# Patient Record
Sex: FEMALE
[De-identification: names, ages, dates, MRNs, and addresses within clinical notes are randomized per-mention and may not be internally consistent; named-entity substitution may affect disease eponyms.]

---

## 2018-10-13 ENCOUNTER — HOSPITAL ENCOUNTER (EMERGENCY)
Dept: HOSPITAL 75 - ER | Age: 48
Discharge: HOME | End: 2018-10-13
Payer: COMMERCIAL

## 2018-10-13 VITALS — WEIGHT: 154 LBS | HEIGHT: 64 IN | BODY MASS INDEX: 26.29 KG/M2

## 2018-10-13 VITALS — SYSTOLIC BLOOD PRESSURE: 125 MMHG | DIASTOLIC BLOOD PRESSURE: 78 MMHG

## 2018-10-13 DIAGNOSIS — K21.0: Primary | ICD-10-CM

## 2018-10-13 DIAGNOSIS — Z79.51: ICD-10-CM

## 2018-10-13 DIAGNOSIS — Z87.19: ICD-10-CM

## 2018-10-13 PROCEDURE — 99283 EMERGENCY DEPT VISIT LOW MDM: CPT

## 2018-10-13 NOTE — XMS REPORT
[facility]

 Created on: 10/07/2018



Marie Cohen

External Reference #: [id number]

: 1970

Sex: Female



Demographics







 Address  [address] [place]  [address]

 

 Preferred Language  Unknown

 

 Marital Status  Unknown

 

 Hoahaoism Affiliation  Unknown

 

 Race  Unknown

 

 Ethnic Group  Unknown





Author







 Author  DIMPLE UNDERWOOD

 

 [facility]

 

 Address  [address] [place]  [address]



 

 Phone  [phone]







Care Team Providers







 Care Team Member Name  Role  Phone

 

 DIMPLE UNDERWOOD  Unavailable  [phone]







PROBLEMS







 Type  Condition  ICD9-CM Code  GMK30-GG Code  Onset Dates  Condition Status  
SNOMED Code

 

 Problem  Difficulty swallowing liquids     R13.10     Active  186210930

 

 Problem  Gastric reflux     K21.9     Active  917709140

 

 Problem  Anxiety     F41.9     Active  05655816







ALLERGIES

No Information



ENCOUNTERS







 Encounter  Location  Date  Diagnosis

 

 [facility]  [address]  [id number] [place] [address]  05 Oct
, 2018  Shortness of breath R06.02 ; Gastric reflux K21.9 and Difficulty 
swallowing liquids R13.10

 

 [facility]  [address]  [id number] [place] [address]  25 Sep
, 2018   

 

 [place] [address]  [id number] [place] [address]  19 Sep
, 2018  Gastric reflux K21.9 and H. pylori infection A04.8

 

 [facility] [place]  [address] [facility] [id number] [address]  18 Sep, 2018   

 

 [facility]  [address]  [id number] [place] [address]    Left breast lump N63.20

 

 [facility]  [address] [id number] 
[address]  17 May, 2018  Dysuria R30.0 and Recurrent UTI (urinary tract 
infection) N39.0

 

 [facility]  [address] [id number] Fork[place] 
[address]    Multiple complaints R68.89

 

 [facility]  [address] [id number] [place] 
[address]  31 Oct, 2017  Urinary frequency R35.0 ; Multiple complaints R68.89 
and Anxiety F41.9

 

 [facility]  [address] [place] [id number] [place] 
[address]    Lump of left breast N63 ; Well woman exam Z01.419 and 
Uterine leiomyoma, unspecified location D25.9

 

 [facility] [place]  [address]65100[place] [address]  29 Mar, 2017  Bronchitis J40

 

 [facility]  [address] [place] [address] [id number][place] [id number]  10 Feb, 2017   

 

 [facility] [place]  [address] [place] [id number]    Sore throat J02.9 and Acute upper respiratory infection 
J06.9

 

 [place] [address] [id number] [place]ork[place] [id number]  
  Encounter for immunization Z23

 

 [facility]  [address] [id number] Fork[place] [address]  
26 Oct, 2016  Dizziness R42

 

 [place] [id number] [place] [id number]  
24 Oct, 2016  Dizziness R42 and Anemia due to other cause, not classified D64.89

 

 [facility]  [address] [facility] [id number] [place]ork[place] [address]  
02 May, 2016  Anemia due to other cause, not classified D64.89 and Abnormal 
urine R82.90

 

 [facility]  [address] [facility] [id number], [place] [id number]     

 

 [facility]  [address] [id number] [place]ork[place] [address]  
  Abnormal urine R82.90 ; Acute cystitis with hematuria N30.01 and 
Iron deficiency E61.1

 

 [facility]  [address] [id number] Fork[place] [address]  
   

 

 [facility]  [id number] Fork[place] [id number]  
  Anemia due to other cause, not classified D64.89

 

 [facility]  [address] [facility] [id number] Fork[place] [address]  
   

 

 [place] [address] [id number] [place]ork[place] [address]  
  Anemia due to other cause, not classified D64.89

 

 [place] [address] [id number] [place]ork[place] [address]  
   

 

 [place] [address] [id number] [place]ork[place] [address]  
  Anemia due to other cause, not classified D64.89

 

 [facility]  [address] [facility] [id number] [place]ork[place] [phone]  
  Gastroesophageal reflux disease, esophagitis presence not 
specified K21.9 and Anemia due to other cause, not classified D64.89

 

 [place] [address] [id number] [place]ork[place] [id number]  
31 Mar, 2016  Prolonged menstrual cycle N92.1 ; Dizziness R42 ; GERD (
gastroesophageal reflux disease) K21.9 ; Chest pain R07.9 ; Fatigue R53.83 and 
Chills (without fever) R68.83

 

 [place] [address] [id number] Fork, [place] [id number]  
30 Mar, 2016  Prolonged menstrual cycle N92.1 ; Dizziness R42 ; GERD (
gastroesophageal reflux disease) K21.9 ; Chest pain R07.9 ; Fatigue R53.83 and 
Chills (without fever) R68.83

 

 [place] [address] [id number] [place]ork[place] [id number]  
27 Oct, 2015  Encounter for immunization Z23

 

 [place] [address] [id number] [place]ork[place] [address]  
26 Oct, 2015  Allergic rhinitis, unspecified allergic rhinitis type J30.9 and 
Post-nasal drip R09.82

 

 [address] [id number], [place] [address]  27 Aug, 2015  Chest pain 786.50 and GERD (gastroesophageal 
reflux disease) 530.81

 

 [place] [id number] [place] [id number]  10 Mohan, 2015  Well woman exam V70.0 ; Pap smear for cervical 
cancer screening V76.2 and Breast cancer screening V76.10

 

 [place] [id number] [place] [id number]    Encounter to establish care V65.8







IMMUNIZATIONS

No Known Immunizations



SOCIAL HISTORY

Never Assessed



REASON FOR VISIT

Requests return call



PLAN OF CARE





VITAL SIGNS





MEDICATIONS

No Known Medications



RESULTS

No Results



PROCEDURES

No Known procedures



INSTRUCTIONS





MEDICATIONS ADMINISTERED

No Known Medications



MEDICAL (GENERAL) HISTORY







 Type  Description  Date

 

 Medical History  acid reflux   

 

 Medical History  seasonal allergies   

 

 Medical History  anemia   

 

 Surgical History  No Surgical history information   

 

 Hospitalization History  Abnormal vaginal bleeding

## 2018-10-13 NOTE — XMS REPORT
[facility]

 Created on: 2017



Marie Cohen

External Reference #: [id number]

: 1970

Sex: Female



Demographics







 Address  [address] [place] [place] [address]  [address]

 

 Preferred Language  Unknown

 

 Marital Status  Unknown

 

 Mormonism Affiliation  Unknown

 

 Race  Unknown

 

 Ethnic Group  Unknown





Author







 Author  JANEE Steen

 

 [facility]

 

 Address  Unknown

 

 Phone  Unavailable







Care Team Providers







 Care Team Member Name  Role  Phone

 

 JANEE Steen  Unavailable  Unavailable







PROBLEMS

No Known Problems



ALLERGIES

No Information



SOCIAL HISTORY

Never Assessed



PLAN OF CARE





VITAL SIGNS





MEDICATIONS

Unknown Medications



RESULTS

No Results



PROCEDURES

No Known procedures



IMMUNIZATIONS

No Known Immunizations



MEDICAL (GENERAL) HISTORY







 Type  Description  Date

 

 Medical History  acid reflux   

 

 Medical History  seasonal allergies   

 

 Medical History  anemia

## 2018-10-13 NOTE — ED GI
General


Chief Complaint:  Respiratory Problems


Stated Complaint:  SOB


Nursing Triage Note:  


PT CO OF SOA STATES STARTED THIS AM THEN BETTER THEN WORSE  ABOUT 1830. PT 


STATES HAS SOA. PT  CALLED ON PHONE STATES HAS HAPPENED BEFORE LAST WEEK 


AT [place] AND PT GIVEN GI COCKTAIL. PT IS CURRENTLY HYPERVENTILATING, STATES 


HAS AND FEET TINGLING. DAUGHTER AT SIDE. PT TO MONITOR. PT HAS HX OF ACID REFLUX


Sepsis Screen:  No Definite Risk


Source of Information:  Patient, Family (daughter)


Exam Limitations:  No Limitations





History of Present Illness


Date Seen by Provider:  Oct 13, 2018


Time Seen by Provider:  19:46


Initial Comments


The patient presents to the ER by private conveyance with her daughter and a 

chief complaint that this morning she was having some burning pressure in her 

chest and just got worse after taking her amoxicillin that she was recently put 

on for H. pylori. She feels like she has a spasm or something tight in the like 

a ball in her chest. She was seen at [facility] for this week or so ago and 

diagnosed with esophageal problems and gastritis and sent to the surgeon who 

has set her up to have a barium swallow study this Monday, 2 days from now. She 

says the discomfort and pain in her chest is so much that she can't stand it. 

They put her on pantoprazole and 2 antibiotics. No Carafate. She uses Maalox 

and in the morning and that helped a little bit but she's not used any more 

today. She has no primary history of coronary or respiratory disease. No asthma 

COPD or smoking. No diabetes hypothyroidism, hypertension, hypercholesterolemia 

or primary family history of coronary artery disease before the age of 60. She 

describes her pain as a tight ball at the top of her chest that does not 

radiate. She has some nausea but no vomiting. She had a EGD done 2 or 3 years 

ago but no mention of dilatation.





Allergies and Home Medications


Allergies


Coded Allergies:  


     No Known Drug Allergies (Unverified , 10/13/18)





Home Medications


Albuterol Sulfate 1 Puff Puff, 2 PUFF IH Q4H, (Reported)


   1 PUFF = 90 MCG 


Pantoprazole Sodium 40 Mg Tablet.dr, 40 MG PO DAILY, (Reported)





Patient Home Medication List


Home Medication List Reviewed:  Yes





Review of Systems


Review of Systems


Constitutional:  No chills, No diaphoresis


EENTM:  No Blurred Vision, No Double Vision


Respiratory:  Denies Cough, Denies Orthopnea; Shortness of Air


Cardiovascular:  See HPI, Chest Pain


Gastrointestinal:  Denies Abdomen Distended, Denies Abdominal Pain


Genitourinary:  Denies Burning, Denies Discharge


Musculoskeletal:  No back pain, No joint pain


Skin:  No pruritus, No rash





Past Medical-Social-Family Hx


Patient Social History


Alcohol Use:  Denies Use


Recreational Drug Use:  No


Smoking Status:  Never a Smoker


Recent Foreign Travel:  No


Contact w/Someone Who Travel:  No


Recent Infectious Disease Expo:  No


Recent Hopitalizations:  No





Seasonal Allergies


Seasonal Allergies:  No





Past Medical History


Surgeries:  No


Respiratory:  No


Cardiac:  No


Neurological:  No


Genitourinary:  No


Gastrointestinal:  Yes (H-PYLORI)


Gastroesophageal Reflux


Endocrine:  No


HEENT:  No


Cancer:  No


Psychosocial:  No


Integumentary:  No





Physical Exam


Vital Signs





Vital Signs - First Documented








 10/13/18





 18:50


 


Temp 98.1


 


Pulse 81


 


Resp 28


 


B/P (MAP) 145/92 (109)


 


Pulse Ox 100





Capillary Refill : Less Than 3 Seconds


Height/Weight/BMI


Height: 5'4.00"


Weight: 154lbs. oz. 69.146043cu;  BMI


Method:Stated


General Appearance:  WD/WN, no apparent distress, other (anxious)


HEENT:  PERRL/EOMI, pharynx normal


Neck:  non-tender, normal inspection


Respiratory:  chest non-tender, lungs clear, normal breath sounds, no 

respiratory distress, no accessory muscle use


Cardiovascular:  normal peripheral pulses, regular rate, rhythm


Gastrointestinal:  normal bowel sounds, non tender, soft


Extremities:  normal inspection, normal capillary refill





Progress/Results/Core Measures


Results/Orders


My Orders





Orders - KIKA ADAMSON


Ondansetron  Oral Dissolve Tab (Zofran (10/13/18 19:51)


Lidocaine 2% Viscous 15 Ml (Xylocaine Vi (10/13/18 20:00)


Famotidine Tablet (Pepcid Tablet) (10/13/18 19:51)


Antacid  Suspension (Mylanta  Suspension (10/13/18 20:00)





Medications Given in ED





Current Medications








 Medications  Dose


 Ordered  Sig/Dileep


 Route  Start Time


 Stop Time Status Last Admin


Dose Admin


 


 Al Hydrox/Mg


 Hydrox/Simethicone  30 ml  ONCE  ONCE


 PO  10/13/18 20:00


 10/13/18 20:01 DC 10/13/18 20:08


30 ML


 


 Lidocaine HCl  15 ml  ONCE  ONCE


 PO  10/13/18 20:00


 10/13/18 20:01 DC 10/13/18 20:08


15 ML








Vital Signs/I&O











 10/13/18





 18:50


 


Temp 98.1


 


Pulse 81


 


Resp 28


 


B/P (MAP) 145/92 (109)


 


Pulse Ox 100














Blood Pressure Mean:  109











Progress


Progress Note #1:  


   Time:  20:58


Progress Note


Patient's having quite a bit of anxiety that was been diagnosed as sounds like 

esophageal spasms and GERD. Try GI cocktail and some Pepcid and if we can get 

her symptoms feeling better we will probably put her out on some Carafate in 

addition. She says the Mylanta helps. No real risk for heart disease. She does 

not have any evidence on her vital signs of any elevated heart rate, decreased 

oxygen saturation below 100%.


After giving her some of the GI cocktail she is slowly sipping it down and says 

it makes her tongue and esophagus numb and it is decreasing her pain and 

discomfort in her chest.


Progress Note #2:  


   Time:  21:25


Progress Note


The patient's pain is relieved by the GI cocktail and Pepcid. We will set her 

up with some Carafate outpatient and keep her appointment for a barium swallow 

study Monday morning.





Departure


Impression





 Primary Impression:  


 GERD with esophagitis


Disposition:  01 HOME, SELF-CARE


Condition:  Stable





Departure-Patient Inst.


Decision time for Depature:  21:26


Patient Instructions:  Acid Reflux (Gastroesophageal Reflux Disease), Adult (DC)





Add. Discharge Instructions:  


 the Carafate and start taking it 30 minutes before all 3 meals and at 

bedtime for the next 2 weeks. Keep your scheduled appointments with the 

surgeon. All discharge instructions reviewed with patient and/or family. Voiced 

understanding.


Scripts


Sucralfate (Carafate) 1 Gm Tablet


1 GM PO QIDACHS for 14 Days, #56 TAB 0 Refills


   Prov: KIKA ADAMSON         10/13/18











KIKA ADAMSON Oct 13, 2018 20:00

## 2018-10-13 NOTE — XMS REPORT
[facility]

 Created on: 08/15/2018



Marie Cohen

External Reference #: [id number]

: 1970

Sex: Female



Demographics







 Address  [address] [place]  [address]

 

 Preferred Language  Unknown

 

 Marital Status  Unknown

 

 Spiritism Affiliation  Unknown

 

 Race  Unknown

 

 Ethnic Group  Unknown





Author







 Author  JOHNNIE SMITH

 

 [facility]

 

 Address  [address] [place] [address]  [address]



 

 Phone  [phone]







Care Team Providers







 Care Team Member Name  Role  Phone

 

 JOHNNIE SMITH  Unavailable  [phone]







PROBLEMS







 Type  Condition  ICD9-CM Code  RAW43-FR Code  Onset Dates  Condition Status  
SNOMED Code

 

 Problem  Anxiety     F41.9     Active  37860173







ALLERGIES







 Substance  Reaction  Event Type  Date  Status

 

 Ibuprofen  angioedema  Drug Allergy  17 May, 2018  Active







ENCOUNTERS







 Encounter  Location  Date  Diagnosis

 

 [facility] [address] [id number] [place] [address]    Left breast lump N63.20

 

 [facility]  [address] [place], [place] [id number], [place] 
[address]  17 May, 2018  Dysuria R30.0 and Recurrent UTI (urinary tract 
infection) N39.0

 

 [facility]  [address] [place] [address], [place] 
[address]    Multiple complaints R68.89

 

 [facility]  [address] [place], [place] [id number], [place] 
[address]  31 Oct, 2017  Urinary frequency R35.0 ; Multiple complaints R68.89 
and Anxiety F41.9

 

 [facility]  [address] [place], [place] [id number], [place] 
[address]    Lump of left breast N63 ; Well woman exam Z01.419 and 
Uterine leiomyoma, unspecified location D25.9

 

 [facility]  [address]65100[place] [address]  29 Mar, 2017  Bronchitis J40

 

 [facility]  [address] [place] [address], [place] [id number]  10 2017   

 

 [facility]  [address] [place] [address] [id number] [address]  08 2017  Sore throat J02.9 and Acute upper respiratory infection 
J06.9

 

 [place] [address] [id number], [place] [id number]  
  Encounter for immunization Z23

 

 [place] [address] [id number], [place] [id number]  
26 Oct, 2016  Dizziness R42

 

 [place] [address] [id number], [place] [address]  
24 Oct, 2016  Dizziness R42 and Anemia due to other cause, not classified D64.89

 

 [place] [address] [id number], [place] [address]  
02 May, 2016  Anemia due to other cause, not classified D64.89 and Abnormal 
urine R82.90

 

 [address] [id number], [place] [id number]     

 

 [place] [address] [id number], [place] [id number]  
  Abnormal urine R82.90 ; Acute cystitis with hematuria N30.01 and 
Iron deficiency E61.1

 

 [place] [address] [id number], [place] [address]  
   

 

 [place] [id number] [address]  
  Anemia due to other cause, not classified D64.89

 

 [address]65100[place] [address]  
   

 

 [place] [address] [id number], [place] [address]  
  Anemia due to other cause, not classified D64.89

 

 [place] [address] [id number], [place] [id number]  
   

 

 [place] [address] [id number], [place] [address]  
  Anemia due to other cause, not classified D64.89

 

 [facility] [place]  [address] [facility] [id number], [place] [id number]  
  Gastroesophageal reflux disease, esophagitis presence not 
specified K21.9 and Anemia due to other cause, not classified D64.89

 

 [facility]  [address] [id number], [place] [id number]  
31 Mar, 2016  Prolonged menstrual cycle N92.1 ; Dizziness R42 ; GERD (
gastroesophageal reflux disease) K21.9 ; Chest pain R07.9 ; Fatigue R53.83 and 
Chills (without fever) R68.83

 

 [place] [address] [id number], [place] [address]  
30 Mar, 2016  Prolonged menstrual cycle N92.1 ; Dizziness R42 ; GERD (
gastroesophageal reflux disease) K21.9 ; Chest pain R07.9 ; Fatigue R53.83 and 
Chills (without fever) R68.83

 

 [place] [address] [id number], [place] [id number]  
27 Oct, 2015  Encounter for immunization Z23

 

 [place] [address] [id number], [place] [address]  
26 Oct, 2015  Allergic rhinitis, unspecified allergic rhinitis type J30.9 and 
Post-nasal drip R09.82

 

 [address] [id number], [place] [id number]  27 Aug, 2015  Chest pain 786.50 and GERD (gastroesophageal 
reflux disease) 530.81

 

 [address] [id number], [place] [id number]  10 Mohan, 2015  Well woman exam V70.0 ; Pap smear for cervical 
cancer screening V76.2 and Breast cancer screening V76.10

 

 [address] [id number], [place] [address]    Encounter to establish care V65.8







IMMUNIZATIONS

No Known Immunizations



SOCIAL HISTORY

Never Assessed



REASON FOR VISIT

pain in right side 2 days ago, states she peed a large amount. onsunday she had 
severe pain in the right pelvic area, and body aches:JACINTO Frank



PLAN OF CARE







 Activity  Details









  









 Follow Up  prn Reason:







VITAL SIGNS







 Height  5 ft 3 in in  2018

 

 Weight  154 lbs  2018

 

 Temperature  98 degrees Fahrenheit  2018

 

 Heart Rate  80 bpm  2018

 

 Respiratory Rate  16   2018

 

 BMI  27.28 kg/m2  2018

 

 Blood pressure systolic  118 mmHg  2018

 

 Blood pressure diastolic  62 mmHg  2018







MEDICATIONS







 Medication  Instructions  Dosage  Frequency  Start Date  End Date  Duration  
Status

 

 Tylenol 325 MG  Orally every 6 hrs  2 tablets as needed  6h           Active

 

 Loratadine 10 MG  Orally Once a day  1 capsule  24h           Active

 

 Bactrim -160 MG  Orally Twice a day  1 tablet  12h  17 May, 2018  24 May
, 2018  07 days  Active

 

 Polysaccharide Iron Complex 150 MG  Orally 2 times a day  1 capsule  12h       30 day(s)  Not-Taking

 

 Lansoprazole 3 MG/ML  Orally 2 times a day  5ml  12h       30 day(s
)  Not-Taking

 

 Prevacid 24HR 15 MG  Orally Once a day  1 capsule  24h           Not-Taking







RESULTS

No Results



PROCEDURES







 Procedure  Date Ordered  Result  Body Site

 

 URINALYSIS, AUTO, W/O SCOPE  May 17, 2018      

 

 URINE CULTURE/COLONY COUNT  May 17, 2018      







INSTRUCTIONS





MEDICATIONS ADMINISTERED

No Known Medications



MEDICAL (GENERAL) HISTORY







 Type  Description  Date

 

 Medical History  acid reflux   

 

 Medical History  seasonal allergies   

 

 Medical History  anemia   

 

 Hospitalization History  Abnormal vaginal bleeding

## 2018-10-13 NOTE — XMS REPORT
[facility]

 Created on: 2016



Marie Cohen

External Reference #: [id number]

: 1970

Sex: Female



Demographics







 Address  [address] [place]  [address]

 

 Home Phone  [phone]

 

 Preferred Language  Unknown

 

 Marital Status  Unknown

 

 Zoroastrianism Affiliation  Unknown

 

 Race  

 

 Ethnic Group   or 





Author







 Author  BRITNEY CONWAY

 

 Organization  [employer]

 

 Address  Unknown

 

 Phone  Unavailable







Care Team Providers







 Care Team Member Name  Role  Phone

 

 BRITNEY CONWAY  CP  Unavailable



                                                                



Allergies

          No Known Allergies                                                   
                                     



Problems

          





 Problem Type  Condition  Code  Onset Dates  Condition Status

 

 Assessment  Anemia due to other cause, not classified  D64.89     Active



                                                                               
         



Medications

          No Known Medications                                                 
                             



Results

          No Known Results                                                     
               



Summary Purpose

          [employer] Submission

## 2018-10-13 NOTE — XMS REPORT
[facility]

 Created on: 2016



Marie Cohen

External Reference #: [id number]

: 1970

Sex: Female



Demographics







 Address  [address] [place]  [address]

 

 Home Phone  [phone]

 

 Preferred Language  Unknown

 

 Marital Status  Unknown

 

 Latter day Affiliation  Unknown

 

 Race  

 

 Ethnic Group   or 





Author







 Author  JANEE DUNLAP

 

 Organization  [employer]

 

 Address  Unknown

 

 Phone  Unavailable







Care Team Providers







 Care Team Member Name  Role  Phone

 

 JANEE DUNLAP    Unavailable



                                                                



Allergies

          No Known Allergies                                                   
                                     



Problems

          





 Problem Type  Condition  Code  Onset Dates  Condition Status

 

 Assessment  Encounter for immunization  Z23     Active



                                                                               
         



Medications

          No Known Medications                                                 
                                       



Procedures

          





 Procedure  Coding System  Code  Date

 

 SINGLE IMMUNIZATION ADMIN  CPT-4  77887  2016

 

 FLUARIX QUAD P-FREE 3 AND UP .50   CPT-4  01164  2016



                                                                               
         



Results

          No Known Results                                                     
                                   



Immunizations

          





 Vaccine  Administration Date

 

 FLUARIX QUAD P-FREE 3 AND UP .50 2016



                                                                    



Summary Purpose

          [employer] Submission

## 2018-10-13 NOTE — XMS REPORT
[facility]

 Created on: 10/26/2015



Marie Cohen

External Reference #: [id number]

: 1970

Sex: Female



Demographics







 Address  [address] [place] [address]  [address]

 

 Home Phone  [phone]

 

 Preferred Language  Unknown

 

 Marital Status  Unknown

 

 Hindu Affiliation  Unknown

 

 Race  

 

 Ethnic Group   or 





Author







 EVELINE Alejandro

 

 Organization  [employer]

 

 Address  Unknown

 

 Phone  Unavailable







Care Team Providers







 Care Team Member Name  Role  Phone

 

 EVELINE FUNK  CP  Unavailable



                                                                



Allergies, Adverse Reactions, Alerts

          





 Substance  Reaction  Event Type

 

 Tylenol  hives  Drug Allergy



                                                                               
         



Problems

          





 Problem Type  Condition  Code  Onset Dates  Condition Status

 

 Assessment  Allergic rhinitis, unspecified allergic rhinitis type  J30.9     
Active

 

 Assessment  Post-nasal drip  R09.82     Active



                                                                               
                   



Medications

          





 Medication  Code System  Code  Instructions  Start Date  End Date  Status  
Dosage

 

 Cetirizine HCl  NDC  [address]-64  10 MG Orally Once a day  Oct 26, 2015  Kuwlinder 
24, 2016     1 tablet as needed

 

 Prevacid 24HR  NDC  [address]-32  15 MG Orally Once a day           1 capsule



                                                                               
                   



Procedures

          





 Procedure  Coding System  Code  Date

 

 Office Visit, Est Pt., Level 3  CPT-4  20957  Oct 26, 2015

 

 MEASURE BLOOD OXYGEN LEVEL  CPT-4  25216  Oct 26, 2015



                                                                               
                             



Vital Signs

          





 Date/Time:  Oct 26, 2015

 

 Temperature  98.0 F

 

 Weight  157 lbs

 

 Height  5 ft 3 in in

 

 Oximetry  98 %

 

 Blood Pressure Diastolic  76 mmHg

 

 Blood Pressure Systolic  120 mmHg

 

 Cardiac Monitoring Heart Rate  88 bpm

 

 BMI  27.81 Index



                                                                              



Results

          No Known Results                                                     
               



Summary Purpose

          eClinical[employer] Submission

## 2018-10-13 NOTE — XMS REPORT
[facility]

 Created on: 10/06/2018



Marie Cohen

External Reference #: [id number]

: 1970

Sex: Female



Demographics







 Address  [address] [place]  [address]

 

 Preferred Language  Unknown

 

 Marital Status  Unknown

 

 Church Affiliation  Unknown

 

 Race  Unknown

 

 Ethnic Group  Unknown





Author







 Author  JAGDEEP LEE

 

 [facility] HERNANDEZ

 

 Address  [address] [place]  [address]



 

 Phone  [phone]







Care Team Providers







 Care Team Member Name  Role  Phone

 

 JAGDEEP LEE  Unavailable  [phone]







PROBLEMS







 Type  Condition  ICD9-CM Code  QKF09-EC Code  Onset Dates  Condition Status  
SNOMED Code

 

 Problem  Difficulty swallowing liquids     R13.10     Active  535297002

 

 Problem  Gastric reflux     K21.9     Active  104889135

 

 Problem  Anxiety     F41.9     Active  72985019







ALLERGIES







 Substance  Reaction  Event Type  Date  Status

 

 Ibuprofen  angioedema  Drug Allergy  19 Sep, 2018  Active







ENCOUNTERS







 Encounter  Location  Date  Diagnosis

 

 [facility]  [address]  [id number] [place] [address]  05 Oct
, 2018  Shortness of breath R06.02 ; Gastric reflux K21.9 and Difficulty 
swallowing liquids R13.10

 

 [facility]  [address]  [id number] [place] [address]  25 Sep
, 2018   

 

 [facility]  [address] [id number] [place] [address]  19 Sep
, 2018  Gastric reflux K21.9 and H. pylori infection A04.8

 

 [facility]  [address] [facility] [id number] [address]  18 Sep, 2018   

 

 [facility]  [address] [id number] [place] [address]    Left breast lump N63.20

 

 [facility]  [address] [email or web address], [place] 
[address]  17 May, 2018  Dysuria R30.0 and Recurrent UTI (urinary tract 
infection) N39.0

 

 [facility]  [address] [id number], [place] 
[address]    Multiple complaints R68.89

 

 [facility]  [address] [id number] 
[address]  31 Oct, 2017  Urinary frequency R35.0 ; Multiple complaints R68.89 
and Anxiety F41.9

 

 [facility]  [id number], [place] 
[address]    Lump of left breast N63 ; Well woman exam Z01.419 and 
Uterine leiomyoma, unspecified location D25.9

 

 [facility]  [address] [address]  29 Mar, 2017  Bronchitis J40

 

 [facility]  [address] [place] [address] [id number][place] [id number]  10 Feb, 2017   

 

 [facility]  [address]65[address] [address]    Sore throat J02.9 and Acute upper respiratory infection 
J06.9

 

 [place] [address] [id number][place] [id number]  
  Encounter for immunization Z23

 

 [place] [address] [id number][place] [id number]  
26 Oct, 2016  Dizziness R42

 

 [place] [address] [id number] [id number]  
24 Oct, 2016  Dizziness R42 and Anemia due to other cause, not classified D64.89

 

 [facility]  [address] [id number] [address]  
02 May, 2016  Anemia due to other cause, not classified D64.89 and Abnormal 
urine R82.90

 

 [facility]  [address] [facility] [id number][place] [id number]     

 

 [facility]  [address] [id number][place] [id number]  
  Abnormal urine R82.90 ; Acute cystitis with hematuria N30.01 and 
Iron deficiency E61.1

 

 [facility]  [address] [id number], [place] [id number]  
   

 

 [facility]  [address] [facility] [id number] [id number]  
  Anemia due to other cause, not classified D64.89

 

 [facility]  [address] [place], [place] [id number] [id number]  
   

 

 [place] [address] [id number], [place] [id number]  
  Anemia due to other cause, not classified D64.89

 

 [place] [address] [id number], [place] [id number]  
   

 

 [place] [address] [id number], [place] [address]  
  Anemia due to other cause, not classified D64.89

 

 [address] [id number], [place] [address]  
  Gastroesophageal reflux disease, esophagitis presence not 
specified K21.9 and Anemia due to other cause, not classified D64.89

 

 [place] [address] [id number][place] [id number]  
31 Mar, 2016  Prolonged menstrual cycle N92.1 ; Dizziness R42 ; GERD (
gastroesophageal reflux disease) K21.9 ; Chest pain R07.9 ; Fatigue R53.83 and 
Chills (without fever) R68.83

 

 [place] [address] [id number][place] [address]  
30 Mar, 2016  Prolonged menstrual cycle N92.1 ; Dizziness R42 ; GERD (
gastroesophageal reflux disease) K21.9 ; Chest pain R07.9 ; Fatigue R53.83 and 
Chills (without fever) R68.83

 

 [place] [address] [id number][place] [id number]  
27 Oct, 2015  Encounter for immunization Z23

 

 [place] [address] [id number], [place] [id number]  
26 Oct, 2015  Allergic rhinitis, unspecified allergic rhinitis type J30.9 and 
Post-nasal drip R09.82

 

 [facility] [address] [id number] [place], [place] [id number]  27 Aug, 2015  Chest pain 786.50 and GERD (gastroesophageal 
reflux disease) 530.81

 

 [address] [id number] [place] [id number]  10 Mohan, 2015  Well woman exam V70.0 ; Pap smear for cervical 
cancer screening V76.2 and Breast cancer screening V76.10

 

 [address] [id number], [place] [id number]  08 2015  Encounter to establish care V65.8







IMMUNIZATIONS

No Known Immunizations



SOCIAL HISTORY

Never Assessed



REASON FOR VISIT

 ER f/u-SOB, chest pressure-Patient states she went to ER Monday, states her 
legs were weak. She was dx with acid reflux and anxiety, pt states she is 
having SOB and burping today but no pain. Aisha RN



PLAN OF CARE







 Activity  Details









  









 Follow Up  4 Weeks Reason:GERD F/U

 

 Pending Test  H PYLORI (IN HOUSE) 



 



VITAL SIGNS







 Height  5 ft 3 in in  2018

 

 Weight  153.6 lbs  2018

 

 Temperature  98.1 degrees Fahrenheit  2018

 

 Heart Rate  77 bpm  2018

 

 Respiratory Rate  16   2018

 

 Oximetry  99 %  2018

 

 BMI  27.21 kg/m2  2018

 

 Blood pressure systolic  110 mmHg  2018

 

 Blood pressure diastolic  70 mmHg  2018







MEDICATIONS







 Medication  Instructions  Dosage  Frequency  Start Date  End Date  Duration  
Status

 

 Pantoprazole Sodium 20 mg  Orally Once a day  1 tablet  24h  19 Sep, 2018     
30 day(s)  Active

 

 Amoxicillin 500 mg  Orally 2 times a day  2 tablet  12h  19 Sep, 2018  3 Oct, 
2018  14 days  Active

 

 Clarithromycin 500 mg  Orally every 12 hrs  1 tablet  12h  19 Sep, 2018  3 Oct
, 2018  14 days  Active







RESULTS







 Name  Result  Date  Reference Range

 

 LIPASE     2018   

 

 LIPASE  29     7-60

 

 AMYLASE     2018   

 

 AMYLASE  40     







PROCEDURES







 Procedure  Date Ordered  Result  Body Site

 

 IMMUNOASSAY,INFECTIOUS AGENT  2018      

 

 ASSAY OF AMYLASE  2018      

 

 ASSAY OF LIPASE  2018      







INSTRUCTIONS





MEDICATIONS ADMINISTERED

No Known Medications



MEDICAL (GENERAL) HISTORY







 Type  Description  Date

 

 Medical History  acid reflux   

 

 Medical History  seasonal allergies   

 

 Medical History  anemia   

 

 Surgical History  No Surgical history information   

 

 Hospitalization History  Abnormal vaginal bleeding

## 2018-10-13 NOTE — XMS REPORT
[facility]

 Created on: 2018



Marie Cohen

External Reference #: [id number]

: 1970

Sex: Female



Demographics







 Address  [address] [place]  [address]

 

 Preferred Language  Unknown

 

 Marital Status  Unknown

 

 Pentecostalism Affiliation  Unknown

 

 Race  Unknown

 

 Ethnic Group  Unknown





Author







 Author  JOHNNIE SMITH

 

 [facility]

 

 Address  [address] [place] [address]  [address]



 

 Phone  [phone]







Care Team Providers







 Care Team Member Name  Role  Phone

 

 JOHNNIE SMITH  Unavailable  [phone]







PROBLEMS







 Type  Condition  ICD9-CM Code  TUE41-UE Code  Onset Dates  Condition Status  
SNOMED Code

 

 Problem  Anxiety     F41.9     Active  97990716







ALLERGIES







 Substance  Reaction  Event Type  Date  Status

 

 Ibuprofen  angioedema  Drug Allergy  31 Oct, 2017  Active







ENCOUNTERS







 Encounter  Location  Date  Diagnosis

 

 [facility]  [address] [place], [place] [id number] [place] [address] 
[address]  17 May, 2018  Dysuria R30.0 and Recurrent UTI (urinary tract 
infection) N39.0

 

 [facility]  [address] [place], [place] [id number] [place] [address] 
[address]    Multiple complaints R68.89

 

 [facility]  [address] [place], [place] [id number] [place] [address] 
[address]  31 Oct, 2017  Urinary frequency R35.0 ; Multiple complaints R68.89 
and Anxiety F41.9

 

 [facility]  [address] [place], [place] [id number] [place] [address] 
[address]    Lump of left breast N63 ; Well woman exam Z01.419 and 
Uterine leiomyoma, unspecified location D25.9

 

 [facility]  [address]00565100[place] [address]  29 Mar, 2017  Bronchitis J40

 

 [facility]  [address] [place], [place] [id number] [place] [id number]  10 2017   

 

 [facility]  [address] [place] [address]  08 2017  Sore throat J02.9 and Acute upper respiratory infection 
J06.9

 

 [facility]  [address] [id number] [place] [id number]  
  Encounter for immunization Z23

 

 [facility]  [address] [id number] Park[place] [id number]  
26 Oct, 2016  Dizziness R42

 

 [place] [address] [id number] [place] [address]  
24 Oct, 2016  Dizziness R42 and Anemia due to other cause, not classified D64.89

 

 [place] [address] [id number] Park[place] [address]  
02 May, 2016  Anemia due to other cause, not classified D64.89 and Abnormal 
urine R82.90

 

 [address] [id number] [place] [address]     

 

 [place] [address] [id number] Park, [place] [address]  
  Abnormal urine R82.90 ; Acute cystitis with hematuria N30.01 and 
Iron deficiency E61.1

 

 [place] [address] [id number] Park, [place] [address]  
   

 

 [place] [id number] Park, [place] [address]  
  Anemia due to other cause, not classified D64.89

 

 [facility]  [address] [place], [place] [id number] [id number]  
   

 

 [place] [address] [id number] Park, [place] [address]  
  Anemia due to other cause, not classified D64.89

 

 [place] [address] [id number] [place] [address]  
   

 

 [facility]  [address] [id number] Park[place] [address]  
  Anemia due to other cause, not classified D64.89

 

 [place] [id number] Park, [place] [address]  
  Gastroesophageal reflux disease, esophagitis presence not 
specified K21.9 and Anemia due to other cause, not classified D64.89

 

 [place] [address] [id number] [place] [address]  
31 Mar, 2016  Prolonged menstrual cycle N92.1 ; Dizziness R42 ; GERD (
gastroesophageal reflux disease) K21.9 ; Chest pain R07.9 ; Fatigue R53.83 and 
Chills (without fever) R68.83

 

 [place] [address] [id number] [place], [place] [address]  
30 Mar, 2016  Prolonged menstrual cycle N92.1 ; Dizziness R42 ; GERD (
gastroesophageal reflux disease) K21.9 ; Chest pain R07.9 ; Fatigue R53.83 and 
Chills (without fever) R68.83

 

 [place] [address] [id number] Park, [place] [address]  
27 Oct, 2015  Encounter for immunization Z23

 

 [place] [address] [id number] [place] [address]  
26 Oct, 2015  Allergic rhinitis, unspecified allergic rhinitis type J30.9 and 
Post-nasal drip R09.82

 

 [place] [id number] [place] [id number]  27 Aug, 2015  Chest pain 786.50 and GERD (gastroesophageal 
reflux disease) 530.81

 

 [place] [id number] [place] [id number]  10 Mohan, 2015  Well woman exam V70.0 ; Pap smear for cervical 
cancer screening V76.2 and Breast cancer screening V76.10

 

 [id number] [place] [id number]    Encounter to establish care V65.8







IMMUNIZATIONS

No Known Immunizations



SOCIAL HISTORY

Never Assessed



REASON FOR VISIT

Pt c/o RUQ pain intermittent with radiation to back for last month, had same 
pain last year; pt c/o GERD.  Pt also c/o fatigue, no energy, and has hx if 
tinnitis and now c/o of feeling like she is going to lose her balance. Iglesia 
RN, pain is 4/10 [facility] HERNAN[facility]



PLAN OF CARE







 Activity  Details









  









 Follow Up  2 Months Reason:Fatigue, Hair Loss, Ab Pain







VITAL SIGNS







 Height  5 ft 3 in in  2017-10-31

 

 Weight  155.6 lbs  2017-10-31

 

 Temperature  98.0 degrees Fahrenheit  2017-10-31

 

 Heart Rate  90 bpm  2017-10-31

 

 Respiratory Rate  16   2017-10-31

 

 BMI  27.56 kg/m2  2017-10-31

 

 Blood pressure systolic  128 mmHg  2017-10-31

 

 Blood pressure diastolic  66 mmHg  2017-10-31







MEDICATIONS







 Medication  Instructions  Dosage  Frequency  Start Date  End Date  Duration  
Status

 

 Tylenol 325 MG  Orally every 6 hrs  2 tablets as needed  6h           Active

 

 Loratadine 10 MG  Orally Once a day  1 capsule  24h           Active







RESULTS







 Name  Result  Date  Reference Range

 

 UA LONG DIP (IN HOUSE)         

 

 Lot #  [id number]      

 

 Exp date  2017      

 

 Clarity  slightly cloudy      

 

 Color  yellow      

 

 Odor  none      

 

 GLU  neg      

 

 SAMIR  neg      

 

 KET  neg      

 

 SG  1.025      

 

 BLO  neg      

 

 pH  5.5      

 

 Protein  neg      

 

 URO  1.0      

 

 NIT  neg      

 

 GIACOMO  neg      

 

 Lot #         

 

 Exp date         







PROCEDURES







 Procedure  Date Ordered  Result  Body Site

 

 URINALYSIS, AUTO, W/O SCOPE  Oct 31, 2017      







INSTRUCTIONS





MEDICATIONS ADMINISTERED

No Known Medications



MEDICAL (GENERAL) HISTORY







 Type  Description  Date

 

 Medical History  acid reflux   

 

 Medical History  seasonal allergies   

 

 Medical History  anemia   

 

 Hospitalization History  Abnormal vaginal bleeding

## 2018-10-13 NOTE — XMS REPORT
[facility]

 Created on: 2016



Marie Cohen

External Reference #: [id number]

: 1970

Sex: Female



Demographics







 Address  [address] [place]  [address]

 

 Home Phone  [phone]

 

 Preferred Language  Unknown

 

 Marital Status  Unknown

 

 [employer]aritan Affiliation  Unknown

 

 Race  

 

 Ethnic Group   or 





Author







 Author  BRITNEY CONWAY

 

 Organization  [employer]

 

 Address  Unknown

 

 Phone  Unavailable







Care Team Providers







 Care Team Member Name  Role  Phone

 

 BRITNEY CONWAY  CP  Unavailable



                                                                



Allergies

          No Known Allergies                                                   
                                     



Problems

          





 Problem Type  Condition  Code  Onset Dates  Condition Status

 

 Assessment  Anemia due to other cause, not classified  D64.89     Active

 

 Assessment  Abnormal urine  R82.90     Active



                                                                               
                   



Medications

          No Known Medications                                                 
                                       



Procedures

          





 Procedure  Coding System  Code  Date

 

 TEST FOR BLOOD, FECES  CPT-4  60560  May 02, 2016



                                                                              



Results

          No Known Results                                                     
               



Summary Purpose

          [employer] Submission

## 2018-10-13 NOTE — XMS REPORT
[facility]

 Created on: 10/03/2018



Marie Cohen

External Reference #: [id number]

: 1970

Sex: Female



Demographics







 Address  [address] [place]  [address]

 

 Preferred Language  Unknown

 

 Marital Status  Unknown

 

 Buddhism Affiliation  Unknown

 

 Race  Unknown

 

 Ethnic Group  Unknown





Author







 Author  DIMPLE UNDERWOOD

 

 [facility]

 

 Address  [address] [place]  [address]



 

 Phone  [phone]







Care Team Providers







 Care Team Member Name  Role  Phone

 

 DIMPLE UNDERWOOD  Unavailable  [phone]







PROBLEMS







 Type  Condition  ICD9-CM Code  GIP45-ZK Code  Onset Dates  Condition Status  
SNOMED Code

 

 Problem  Gastric reflux     K21.9     Active  714055073

 

 Problem  Anxiety     F41.9     Active  62248857







ALLERGIES

No Information



ENCOUNTERS







 Encounter  Location  Date  Diagnosis

 

 [facility]  [address]  [id number] [place] [address]  25 Sep
, 2018   

 

 [facility]  [address]  [id number] [place] [address]  19 Sep
, 2018  Gastric reflux K21.9 and H. pylori infection A04.8

 

 [facility]  [address] [facility] [id number] [address]  18 Sep, 2018   

 

 [facility]  [address]  [id number] [place] [address]    Left breast lump N63.20

 

 [facility]  [id number], [place] 
[address]  17 May, 2018  Dysuria R30.0 and Recurrent UTI (urinary tract 
infection) N39.0

 

 [facility]  [address] [id number], [place] 
[address]    Multiple complaints R68.89

 

 [facility]  [address] [id number], [place] 
[address]  31 Oct, 2017  Urinary frequency R35.0 ; Multiple complaints R68.89 
and Anxiety F41.9

 

 [facility]  [address] [id number], [place] 
[address]    Lump of left breast N63 ; Well woman exam Z01.419 and 
Uterine leiomyoma, unspecified location D25.9

 

 [facility]  [address] [facility] [id number], [place] [address]  29 Mar, 2017  Bronchitis J40

 

 [facility]  [address] [place] [address] [id number][place] [id number]  10 2017   

 

 [facility]  [address] [id number] [address]  08 2017  Sore throat J02.9 and Acute upper respiratory infection 
J06.9

 

 [address] [id number][place] [address]  
  Encounter for immunization Z23

 

 [address] [id number][place] [address]  
26 Oct, 2016  Dizziness R42

 

 [address] [id number][place] [address]  
24 Oct, 2016  Dizziness R42 and Anemia due to other cause, not classified D64.89

 

 [address] [id number], [place] [address]  
02 May, 2016  Anemia due to other cause, not classified D64.89 and Abnormal 
urine R82.90

 

 [facility]  [address] [facility] [place] [id number][place] [id number]     

 

 [facility]  [address] [id number], [place] [address]  
  Abnormal urine R82.90 ; Acute cystitis with hematuria N30.01 and 
Iron deficiency E61.1

 

 [facility]  [address] [id number], [place] [address]  
   

 

 [facility]  [id number], [place] [address]  
  Anemia due to other cause, not classified D64.89

 

 [facility]  [address] [id number], [place] [address]  
   

 

 [address] [id number] [place] [address]  
  Anemia due to other cause, not classified D64.89

 

 [address] [id number], [place] [id number]  
   

 

 [address] [id number], [place] [address]  
  Anemia due to other cause, not classified D64.89

 

 [facility]  [address] [facility] [id number] [place] [address]  
  Gastroesophageal reflux disease, esophagitis presence not 
specified K21.9 and Anemia due to other cause, not classified D64.89

 

 [address] [id number], [place] [id number]  
31 Mar, 2016  Prolonged menstrual cycle N92.1 ; Dizziness R42 ; GERD (
gastroesophageal reflux disease) K21.9 ; Chest pain R07.9 ; Fatigue R53.83 and 
Chills (without fever) R68.83

 

 [address] [id number], [place] [address]  
30 Mar, 2016  Prolonged menstrual cycle N92.1 ; Dizziness R42 ; GERD (
gastroesophageal reflux disease) K21.9 ; Chest pain R07.9 ; Fatigue R53.83 and 
Chills (without fever) R68.83

 

 [address] [id number], [place] [address]  
27 Oct, 2015  Encounter for immunization Z23

 

 [address] [id number], [place] [address]  
26 Oct, 2015  Allergic rhinitis, unspecified allergic rhinitis type J30.9 and 
Post-nasal drip R09.82

 

 [facility]  [address] [id number], [place] [address]  27 Aug, 2015  Chest pain 786.50 and GERD (gastroesophageal 
reflux disease) 530.81

 

 [facility]  [address] [place] [address] [id number], [place] [address]  10 Mohan, 2015  Well woman exam V70.0 ; Pap smear for cervical 
cancer screening V76.2 and Breast cancer screening V76.10

 

 [address] [id number] [place] [phone]  08 2015  Encounter to establish care V65.8







IMMUNIZATIONS

No Known Immunizations



SOCIAL HISTORY

Never Assessed



REASON FOR VISIT

ER note



PLAN OF CARE





VITAL SIGNS





MEDICATIONS

Unknown Medications



RESULTS

No Results



PROCEDURES

No Known procedures



INSTRUCTIONS





MEDICATIONS ADMINISTERED

No Known Medications



MEDICAL (GENERAL) HISTORY







 Type  Description  Date

 

 Medical History  acid reflux   

 

 Medical History  seasonal allergies   

 

 Medical History  anemia   

 

 Surgical History  No Surgical history information   

 

 Hospitalization History  Abnormal vaginal bleeding

## 2018-10-13 NOTE — XMS REPORT
[facility]

 Created on: 2016



Marie Cohen

External Reference #: [id number]

: 1970

Sex: Female



Demographics







 Address  [address] [place]  [address]

 

 Home Phone  [phone]

 

 Preferred Language  Unknown

 

 Marital Status  Unknown

 

 Mandaen Affiliation  Unknown

 

 Race  

 

 Ethnic Group   or 





Author







 Author  RITESH MARTIN

 

 [facility]  eClinicalWorks

 

 Address  Unknown

 

 Phone  Unavailable







Care Team Providers







 Care Team Member Name  Role  Phone

 

 RITESH MARTIN    Unavailable



                                                                



Allergies, Adverse Reactions, Alerts

          





 Substance  Reaction  Event Type

 

 Ibuprofen  angioedema  Drug Allergy



                                                                               
         



Problems

          





 Problem Type  Condition  Code  Onset Dates  Condition Status

 

 Assessment  Abnormal urine  R82.90     Active

 

 Assessment  Acute cystitis with hematuria  N30.01     Active

 

 Assessment  Iron deficiency  E61.1     Active



                                                                               
                             



Medications

          





 Medication  Code System  Code  Instructions  Start Date  End Date  Status  
Dosage

 

 Polysaccharide Iron Complex  NDC  [address]-57  150 MG Orally 2 times a day  
2016        1 capsule

 

 Macrobid  NDC  [address]-89  100 MG Orally every 12 hrs       1 capsule with food



                                                                               
                   



Procedures

          





 Procedure  Coding System  Code  Date

 

 URINE CULTURE/COLONY COUNT  CPT-4  91430  2016

 

 Office Visit, Est Pt., Level 3  CPT-4  50288  2016

 

 URINALYSIS, AUTO, W/O SCOPE  CPT-4  76812  2016



                                                                               
                                       



Vital Signs

          





 Date/Time:  2016

 

 Temperature  98.6 F

 

 Weight  148 lbs

 

 Height  5 ft 3 in in

 

 BMI  26.21 Index

 

 Blood Pressure Diastolic  68 mmHg

 

 Blood Pressure Systolic  96 mmHg

 

 Cardiac Monitoring Heart Rate  104 bpm



                                                                    



Results

          





 Name  Result  Date  Reference Range  Unit  Abnormality Flag

 

 UA LONG DIP (IN HOUSE)               

 

 ----GIACOMO  +  2016         

 

 ----NIT  positive  2016         

 

 ----SG  1.030  2016         

 

 ----KET  ++  2016         

 

 ----SAMIR  n/a  2016         

 

 ----GLU  neg  2016         

 

 ----Odor  none  2016         

 

 ----pH  6.0  2016         

 

 ----BLO  large  2016         

 

 ----URO  n/a  2016         

 

 ----Protein  +  2016         

 

 ----Lot #  [id number]  2016         

 

 ----Exp date  2016         

 

 ----Clarity  cloudy  2016         

 

 ----Color  dark yellow  2016         



                                                                    



Summary Purpose

          eClinicalWorks Submission

## 2018-10-13 NOTE — XMS REPORT
[facility]

 Created on: 2015



Marie Cohen

External Reference #: [id number]

: 1970

Sex: Female



Demographics







 Address  [address] [place] [address]  [address]

 

 Home Phone  [phone]

 

 Preferred Language  Unknown

 

 Marital Status  Unknown

 

 Spiritism Affiliation  Unknown

 

 Race  

 

 Ethnic Group   or 





Author







 Author  BRENT LARSEN

 

 [facility]  eClinicalWorks

 

 Address  Unknown

 

 Phone  Unavailable







Care Team Providers







 Care Team Member Name  Role  Phone

 

 BRENT LARSEN  CP  Unavailable



                                                                



Allergies, Adverse Reactions, Alerts

          





 Substance  Reaction  Event Type

 

 Tylenol  hives  Drug Allergy



                                                                               
         



Problems

          





 Problem Type  Condition  ICD-9 Code  Onset Dates  Condition Status

 

 Assessment  Chest pain  786.50     Active

 

 Assessment  GERD (gastroesophageal reflux disease)  530.81     Active



                                                                               
                   



Medications

          





 Medication  Code System  Code  Instructions  Start Date  End Date  Status  
Dosage

 

 Prevacid 24HR  NDC  [address]-33  15 MG Orally Once a day           1 capsule



                                                                               
         



Procedures

          





 Procedure  Coding System  Code  Date

 

 Office Visit, Est Pt., Level 3  CPT-4  41225  Aug 27, 2015



                                                                               
                   



Vital Signs

          





 Date/Time:  Aug 27, 2015

 

 Temperature  98.2 F

 

 Weight  155 lbs

 

 Height  5 ft 3 in in

 

 BMI  27.45 Index

 

 Blood Pressure Diastolic  72 mmHg

 

 Blood Pressure Systolic  116 mmHg

 

 Cardiac Monitoring Heart Rate  78 bpm



                                                                              



Results

          No Known Results                                                     
               



Summary Purpose

          eClinicalWorks Submission

## 2018-10-13 NOTE — XMS REPORT
[facility]

 Created on: 2018



Marie Cohen

External Reference #: [id number]

: 1970

Sex: Female



Demographics







 Address  [address] [place]  [address]

 

 Preferred Language  Unknown

 

 Marital Status  Unknown

 

 Caodaism Affiliation  Unknown

 

 Race  Unknown

 

 Ethnic Group  Unknown





Author







 Author  JOHNNIE SMITH

 

 [facility]

 

 Address  [address] [place] [address]  [address]



 

 Phone  [phone]







Care Team Providers







 Care Team Member Name  Role  Phone

 

 JOHNNIE SMITH  Unavailable  [phone]







PROBLEMS







 Type  Condition  ICD9-CM Code  HBP30-WY Code  Onset Dates  Condition Status  
SNOMED Code

 

 Problem  Anxiety     F41.9     Active  16282235







ALLERGIES







 Substance  Reaction  Event Type  Date  Status

 

 Ibuprofen  angioedema  Drug Allergy  29 Mar, 2017  Active







ENCOUNTERS







 Encounter  Location  Date  Diagnosis

 

 [facility]  [address] [place], [place] [id number] [place] [address] 
[address]    Multiple complaints R68.89

 

 [facility]  [address] [place], [place] [id number] [place] [address] 
[address]  31 Oct, 2017  Urinary frequency R35.0 ; Multiple complaints R68.89 
and Anxiety F41.9

 

 [facility]  [address] [place], [place] [id number] [place] [address] 
[address]    Lump of left breast N63 ; Well woman exam Z01.419 and 
Uterine leiomyoma, unspecified location D25.9

 

 [facility]  [address]65100[place] [address]  29 Mar, 2017  Bronchitis J40

 

 [facility]  [address] [place], [place] [id number] [place] [id number]  10 Feb, 2017   

 

 [facility]  [id number] [place] [address]  08 2017  Sore throat J02.9 and Acute upper respiratory infection 
J06.9

 

 [facility]  [address] [id number] [place], [place] [address]  
  Encounter for immunization Z23

 

 [facility]  [address] [id number] [place] [address]  
26 Oct, 2016  Dizziness R42

 

 [facility]  [address] [id number] [place] [address]  
24 Oct, 2016  Dizziness R42 and Anemia due to other cause, not classified D64.89

 

 [facility]  [address] [id number] [place] [address]  
02 May, 2016  Anemia due to other cause, not classified D64.89 and Abnormal 
urine R82.90

 

 [address] [id number][place] [address]     

 

 [facility]  [address] [id number] [place] [address]  
  Abnormal urine R82.90 ; Acute cystitis with hematuria N30.01 and 
Iron deficiency E61.1

 

 [address] [id number] [place], [place] [address]  
   

 

 [address] [facility] [id number] [place] [address]  
  Anemia due to other cause, not classified D64.89

 

 [facility]  [address] [facility] [id number] [place] [address]  
   

 

 [address] [id number] [place] [address]  
  Anemia due to other cause, not classified D64.89

 

 [facility]  [address] [id number] [place] [address]  
   

 

 [address] [id number] [place] [address]  
  Anemia due to other cause, not classified D64.89

 

 [address] [facility] [id number] [place] [address]  
  Gastroesophageal reflux disease, esophagitis presence not 
specified K21.9 and Anemia due to other cause, not classified D64.89

 

 [address] [id number] [place], [place] [address]  
31 Mar, 2016  Prolonged menstrual cycle N92.1 ; Dizziness R42 ; GERD (
gastroesophageal reflux disease) K21.9 ; Chest pain R07.9 ; Fatigue R53.83 and 
Chills (without fever) R68.83

 

 [facility]  [address] [id number] [place], [place] [address]  
30 Mar, 2016  Prolonged menstrual cycle N92.1 ; Dizziness R42 ; GERD (
gastroesophageal reflux disease) K21.9 ; Chest pain R07.9 ; Fatigue R53.83 and 
Chills (without fever) R68.83

 

 [address] [id number] [place], [place] [address]  
27 Oct, 2015  Encounter for immunization Z23

 

 [address] [id number] [place] [address]  
26 Oct, 2015  Allergic rhinitis, unspecified allergic rhinitis type J30.9 and 
Post-nasal drip R09.82

 

 [address] [id number], [place] [address]  27 Aug, 2015  Chest pain 786.50 and GERD (gastroesophageal 
reflux disease) 530.81

 

 [address] [place] [id number]  10 Mohan, 2015  Well woman exam V70.0 ; Pap smear for cervical 
cancer screening V76.2 and Breast cancer screening V76.10

 

 [address] [place] [id number]    Encounter to establish care V65.8







IMMUNIZATIONS

No Known Immunizations



SOCIAL HISTORY

Never Assessed



REASON FOR VISIT

Prod.Cough, dyspnea, fatique and fever of 100.3/ M. DEREK Chew.



PLAN OF CARE





VITAL SIGNS







 Height  5 ft 3 in in  2017

 

 Weight  153.8 lbs  2017

 

 Temperature  98.8 degrees Fahrenheit  2017

 

 Heart Rate  92 bpm  2017

 

 Respiratory Rate  18   2017

 

 Oximetry  98 %  2017

 

 BMI  27.24 kg/m2  2017

 

 Blood pressure systolic  102 mmHg  2017

 

 Blood pressure diastolic  60 mmHg  2017







MEDICATIONS







 Medication  Instructions  Dosage  Frequency  Start Date  End Date  Duration  
Status

 

 Loratadine 10 MG  Orally Once a day  1 capsule  24h           Active

 

 Ciprofloxacin HCl 500 mg  Orally Twice a day  1 tablet  12h  29 Mar, 2017  5 
Apr, 2017  07 days  Active

 

 Robitussin CoughGels                    Active

 

 Tylenol 325 MG  Orally every 6 hrs  2 tablets as needed  6h           Active







RESULTS

No Results



PROCEDURES







 Procedure  Date Ordered  Result  Body Site

 

 MEASURE BLOOD OXYGEN LEVEL  2017      







INSTRUCTIONS





MEDICATIONS ADMINISTERED

No Known Medications



MEDICAL (GENERAL) HISTORY







 Type  Description  Date

 

 Medical History  acid reflux   

 

 Medical History  seasonal allergies   

 

 Medical History  anemia   

 

 Hospitalization History  Abnormal vaginal bleeding

## 2018-10-13 NOTE — XMS REPORT
[facility]

 Created on: 2018



Marie Cohen

External Reference #: [id number]

: 1970

Sex: Female



Demographics







 Address  [address] [place]  [address]

 

 Preferred Language  Unknown

 

 Marital Status  Unknown

 

 Church Affiliation  Unknown

 

 Race  Unknown

 

 Ethnic Group  Unknown





Author







 Author  LUIS  JOHNNIE

 

 Organization  [facility]

 

 Address  [address] [place] [address]  [address]



 

 Phone  [phone]







Care Team Providers







 Care Team Member Name  Role  Phone

 

 JOHNNIE SMITH  Unavailable  [phone]







PROBLEMS







 Type  Condition  ICD9-CM Code  CBM72-TH Code  Onset Dates  Condition Status  
SNOMED Code

 

 Problem  Anxiety     F41.9     Active  34940982







ALLERGIES

No Information



ENCOUNTERS







 Encounter  Location  Date  Diagnosis

 

 [facility]  [address] [place], [place] [address] [place] [address] 
[address]  17 May, 2018  Dysuria R30.0 and Recurrent UTI (urinary tract 
infection) N39.0

 

 [facility]  [address] [place], [place] [address] [place] [address] 
[address]    Multiple complaints R68.89

 

 [facility]  [address] [place], [place] [address] [place] [address] 
[address]  31 Oct, 2017  Urinary frequency R35.0 ; Multiple complaints R68.89 
and Anxiety F41.9

 

 [facility]  [address] [place], [place] [id number] [place] [address] 
[address]    Lump of left breast N63 ; Well woman exam Z01.419 and 
Uterine leiomyoma, unspecified location D25.9

 

 [facility]  [address]00565100[place] [address]  29 Mar, 2017  Bronchitis J40

 

 [facility]  [address] [place], [place] [address] [place] [id number]  10 2017   

 

 [facility]  [address] [place] [address]  08 2017  Sore throat J02.9 and Acute upper respiratory infection 
J06.9

 

 [facility]  [address] [id number], [place] [address]  
  Encounter for immunization Z23

 

 [address] [id number], [place] [address]  
26 Oct, 2016  Dizziness R42

 

 [address] [id number], [place] [address]  
24 Oct, 2016  Dizziness R42 and Anemia due to other cause, not classified D64.89

 

 [address] [id number], [place] [address]  
02 May, 2016  Anemia due to other cause, not classified D64.89 and Abnormal 
urine R82.90

 

 [address] [id number] City[place] [phone]     

 

 [facility]  [address] [id number], [place] [address]  
  Abnormal urine R82.90 ; Acute cystitis with hematuria N30.01 and 
Iron deficiency E61.1

 

 [address] [id number], [place] [address]  
   

 

 [id number], [place] [address]  
  Anemia due to other cause, not classified D64.89

 

 [facility]  [address] [id number][place] [address]  
   

 

 [address] [id number], [place] [address]  
  Anemia due to other cause, not classified D64.89

 

 [address] [id number], [place] [address]  
   

 

 [address] [id number], [place] [address]  
  Anemia due to other cause, not classified D64.89

 

 [facility]  [id number], [place] [address]  
  Gastroesophageal reflux disease, esophagitis presence not 
specified K21.9 and Anemia due to other cause, not classified D64.89

 

 [address] [id number], [place] [address]  
31 Mar, 2016  Prolonged menstrual cycle N92.1 ; Dizziness R42 ; GERD (
gastroesophageal reflux disease) K21.9 ; Chest pain R07.9 ; Fatigue R53.83 and 
Chills (without fever) R68.83

 

 [address] [id number], [place] [address]  
30 Mar, 2016  Prolonged menstrual cycle N92.1 ; Dizziness R42 ; GERD (
gastroesophageal reflux disease) K21.9 ; Chest pain R07.9 ; Fatigue R53.83 and 
Chills (without fever) R68.83

 

 [address] [id number], [place] [address]  
27 Oct, 2015  Encounter for immunization Z23

 

 [address] [id number], [place] [address]  
26 Oct, 2015  Allergic rhinitis, unspecified allergic rhinitis type J30.9 and 
Post-nasal drip R09.82

 

 [address] [place] [id number]  27 Aug, 2015  Chest pain 786.50 and GERD (gastroesophageal 
reflux disease) 530.81

 

 [address] [place] [id number]  10 Mohan, 2015  Well woman exam V70.0 ; Pap smear for cervical 
cancer screening V76.2 and Breast cancer screening V76.10

 

 [address] [id number], [place] [address]    Encounter to establish care V65.8







IMMUNIZATIONS

No Known Immunizations



SOCIAL HISTORY

Never Assessed



REASON FOR VISIT

Lab- [facility]



PLAN OF CARE





VITAL SIGNS





MEDICATIONS

No Known Medications



RESULTS







 Name  Result  Date  Reference Range

 

 CBC     2018   

 

 WHITE BLOOD CELL COUNT  5.2     3.8-10.8

 

 RED BLOOD CELL COUNT  4.55     3.80-5.10

 

 HEMOGLOBIN  13.0     11.7-15.5

 

 HEMATOCRIT  39.5     35.0-45.0

 

 MCV  86.8     80.0-100.0

 

 MCH  28.6     27.0-33.0

 

 MCHC  32.9     32.0-36.0

 

 RDW  13.9     11.0-15.0

 

 PLATELET COUNT  216     140-400

 

 MPV  9.9     7.5-12.5

 

 ABSOLUTE NEUTROPHILS  2974     3791-7559

 

 ABSOLUTE LYMPHOCYTES  1856     850-3900

 

 ABSOLUTE MONOCYTES  250     200-950

 

 ABSOLUTE EOSINOPHILS  88     

 

 ABSOLUTE BASOPHILS  31     0-200

 

 NEUTROPHILS  57.2      

 

 LYMPHOCYTES  35.7      

 

 MONOCYTES  4.8      

 

 EOSINOPHILS  1.7      

 

 BASOPHILS  0.6      







PROCEDURES







 Procedure  Date Ordered  Result  Body Site

 

 COMPLETE CBC W/AUTO DIFF WBC  2018      

 

 VENIPUNCT, ROUTINE*  2018      







INSTRUCTIONS





MEDICATIONS ADMINISTERED

No Known Medications



MEDICAL (GENERAL) HISTORY







 Type  Description  Date

 

 Medical History  acid reflux   

 

 Medical History  seasonal allergies   

 

 Medical History  anemia   

 

 Hospitalization History  Abnormal vaginal bleeding

## 2018-10-13 NOTE — XMS REPORT
Continuity of Care Document

 Created on: 10/13/2018



Marie Cohen

External Reference #: [id number]

: 1970

Sex: Female



Demographics







 Address  [address]

[place]  [address]

 

 Home Phone  [phone] x

 

 Preferred Language  Unknown

 

 Marital Status  Unknown

 

 Baptism Affiliation  Unknown

 

 Race  Unknown

 

 Ethnic Group  Unknown





Author







 Author  [facility]

 

 Address  Unknown

 

 Phone  Unavailable



              



Allergies

      





 Active            Description            Code            Type            
Severity            Reaction            Onset            Reported/Identified   
         Relationship to Patient            Clinical Status        

 

 Yes            NSAIDS                         Drug Allergy            N/A     
       N/A                                                            



                  



Medications

      



There is no data.                  



Problems

      





 Date Dx Coded            Attending            Type            Code            
Diagnosis            Diagnosed By        

 

 2018                         P                        Unspecified 
lump in the left breast, unspecified quadrant                     



                  



Procedures

      



There is no data.                  



Results

      





 Test            Result            Range        









 CBC - 18 10:28         









 WHITE BLOOD CELL COUNT            5.2 Thousand/uL            3.8-10.8        

 

 RED BLOOD CELL COUNT            4.55 Million/uL            3.80-5.10        

 

 HEMOGLOBIN            13.0 g/dL            11.7-15.5        

 

 HEMATOCRIT            39.5 %            35.0-45.0        

 

 MCV            86.8 fL            80.0-100.0        

 

 MCH            28.6 pg            27.0-33.0        

 

 MCHC            32.9 g/dL            32.0-36.0        

 

 RDW            13.9 %            11.0-15.0        

 

 PLATELET COUNT            216 Thousand/uL            140-400        

 

 MPV            9.9 fL            7.5-12.5        

 

 ABSOLUTE NEUTROPHILS            2974 cells/uL            3192-1657        

 

 ABSOLUTE LYMPHOCYTES            1856 cells/uL            850-3900        

 

 ABSOLUTE MONOCYTES            250 cells/uL            200-950        

 

 ABSOLUTE EOSINOPHILS            88 cells/uL                    

 

 ABSOLUTE BASOPHILS            31 cells/uL            0-200        

 

 NEUTROPHILS            57.2 %            NRG        

 

 LYMPHOCYTES            35.7 %            NRG        

 

 MONOCYTES            4.8 %            NRG        

 

 EOSINOPHILS            1.7 %            NRG        

 

 BASOPHILS            0.6 %            NRG        









 CULTURE, URINE - 18 10:43         









 CULTURE, URINE, ROUTINE            SEE NOTE             NRG        









 LIPASE - 18 17:45         









 LIPASE            29 U/L            7-60        









 AMYLASE - 18 17:45         









 AMYLASE            40 U/L                    



                      



Encounters

      





 ACCT No.            Visit Date/Time            Discharge            Status    
        Pt. Type            Provider            Facility            Loc./Unit  
          Complaint        

 

 [id number]            10/10/2018 16:57:36            10/10/2018 23:59:59          
  [facility]            Outpatient            Marlo Elizalde                           
                    

 

 [id number]            10/18/2014 11:31:55            10/18/2014 23:59:59          
  [facility]            Outpatient            Nico Espinal                           
                    

 

 [id number]            2018 13:20:00            2018 23:59:59          
  [facility]            Outpatient            [facility]                     

 

 [id number]            2018 17:00:00                                      
Document Registration                                                          
  

 

 [id number]            2018 09:40:00                                      
Document Registration                                                          
  

 

 [id number]            2018 10:20:00                                      
Document Registration                                                          
  

 

 [id number]            2018 09:12:00                                      
Document Registration                                                          
  

 

 [id number]            2016 18:18:58            2016 18:18:58        
                 Outpatient                         [facility]            U

## 2018-10-13 NOTE — XMS REPORT
[facility]

 Created on: 2016



Marie Cohen

External Reference #: [id number]

: 1970

Sex: Female



Demographics







 Address  [address]  [address]

 

 Home Phone  [phone]

 

 Preferred Language  Unknown

 

 Marital Status  Unknown

 

 Mandaen Affiliation  Unknown

 

 Race  

 

 Ethnic Group   or 





Author







 Author  RITESH MARTIN

 

 [facility]  [employer]

 

 Address  Unknown

 

 Phone  Unavailable







Care Team Providers







 Care Team Member Name  Role  Phone

 

 RITESH MARTIN    Unavailable



                                                                



Allergies

          No Known Allergies                                                   
                                     



Problems

          No Known Problems                                                    
                                    



Medications

          





 Medication  Code System  Code  Instructions  Start Date  End Date  Status  
Dosage

 

 Cipro  NDC  [address]-32  500 MG Orally Twice a day  2016  May 02, 
2016     1 tablet



                                                                              



Results

          No Known Results                                                     
               



Summary Purpose

          eClinicalWorks Submission

## 2018-10-13 NOTE — XMS REPORT
Continuity of Care Document

 Created on: 2016



MARIE BAGLEY

External Reference #: [id number]

: 1970

Sex: Female



Demographics







 Address  [address] [place]  [address]

 

 Home Phone  [phone]

 

 Preferred Language  Unknown

 

 Marital Status  

 

 Buddhism Affiliation  Lutheran (non-Anglican, non-specific)

 

 Race  Other Race

 

 Ethnic Group  Unknown





Author







 Author  [facility]

 

 Organization  [facility]

 

 Address  [facility]

 [address] [place] [address]  [address]



 

 Phone  Unavailable







Support







 Name  Relationship  Address  Phone

 

 AYAD JAFFE DO  Caregiver  [address]

[place]  [address]  [phone]

 

 RYAN BAGLEY  Next Of Kin  [address]

[place]  [address] [phone]







Insurance Providers







 Payer Name  Policy Number  Subscriber Name  Relationship

 

 [employer]  [id number]  Marie Bagley D  18 Self / Same As Patient







Advance Directives







 Directive  Response  Recorded Date/Time

 

 Advance Directives  No  04/15/16 2:08pm

 

 Living Will  No  04/15/16 2:08pm

 

 Health Care Proxy  No  16 1:28pm

 

 Power of  for Health Care  No  04/15/16 2:08pm

 

 Organ, Tissue, or Eye Donor  No  04/15/16 2:08pm

 

 Do you have a signed organ donor card?  No  04/15/16 2:08pm







Chief Complaint and Reason for Visit







 Chief Complaint  ANEMIA

 

 Reason for Visit  [id number]

Shortness of breath

Vaginal bleeding







Problems

Active Problems







 Medical Problem  Onset Date  Status

 

 Chest pressure  Unknown  Acute

 

 Shortness of breath  Unknown  Acute

 

 Vaginal bleeding  Unknown  Acute







Medications

No medication information available.



Social History







 Social History Problem  Response  Recorded Date/Time

 

 Tobacco Use  Denies Use  2016 2:21pm

 

 Alcohol Use  none  2016 2:21pm

 

 Drug Use  none  2016 2:21pm

 

 Employment  Employed  2016 2:21pm







Hospital Discharge Instructions

No hospital discharge instructions.



Plan of Care







 Discharge Date  16 3:30pm

 

 Condition at Discharge  Stable

 

 Instructions/Education Provided  Dysfunctional Uterine Bleeding (ED)

 

 Prescriptions  See Medication Section

 

 Referrals  ERIK HOWARD D.O. - 





Geremias Santana M.D. - 







Functional Status







 Query  Response  Date Recorded

 

 Patient Behavior  Fatigued

Fearful

Appropriate

Dependent

  2016 1:27pm







Allergies, Adverse Reactions, Alerts

No allergy information available.



Immunizations







 Name  Given  Type

 

 Hx Influenza Vaccination  Y 2015  Historical







Vital Signs

Acute Vital Signs





 Vital  Response  Date/Time

 

 Temperature (Fahrenheit)  98.7 degrees F (97.6 - 99.5)  2016 1:30pm

 

 Temperature Source  Temporal Artery  2016 1:30pm

 

 Pulse Rate (adult)  96 bpm (60 - 90)  2016 1:30pm

 

 Respiratory Rate  18 bpm (12 - 24)  2016 1:30pm

 

 Blood Pressure  110/81 mm Hg  2016 1:30pm

 

 O2 Sat by Pulse Oximetry  100 % (90 - 100)  2016 1:30pm

 

 Oxygen Delivery Method     2016 1:30pm

 

 Height  5 ft 4 in   

 

 Weight  165 lb   

 

 Body Mass Index  28.0 kg/m^2   







Results

Laboratory Results







 Test Name  Result  Units  Flags  Reference  Collection Date/Time  Result Date/
Time  Comments

 

 White Blood Count  3.5  K/uL   L  4.8-10.8  2016 2:10pm  2016 2:
19pm   

 

 Red Blood Count  4.04  M/uL   L  4.20-5.40  2016 2:10pm  2016 2:
19pm   

 

 Hemoglobin  11.3  gm/dL   L  12.0-16.0  2016 2:10pm  2016 2:19pm   

 

 Hematocrit  34.7  %   L  37.0-47.0  2016 2:10pm  2016 2:19pm   

 

 Mean Corpuscular Volume  86.0  fL     81.0-99.0  2016 2:10pm  2016 
2:19pm   

 

 Mean Corpuscular Hemoglobin  27.9  pg     27.0-31.0  2016 2:10pm  04/17/
2016 2:19pm   

 

 Mean Corpuscular Hemoglobin Concent  32.6  g/dL     30.0-37.0  2016 2:
10pm  2016 2:19pm   

 

 Red Cell Distribution Width  12.8  %     11.5-14.5  2016 2:10pm  04/17/
2016 2:19pm   

 

 Platelet Count  236  K/uL     130-400  2016 2:10pm  2016 2:19pm   

 

 Mean Platelet Volume  8.6  fL     7.4-10.4  2016 2:10pm  2016 2:
19pm   

 

 Neutrophils (%) (Auto)  75.3  %   H  42.2-75.2  2016 2:2016 2
:19pm   

 

 Lymphocytes (%) (Auto)  18.1  %   L  20.5-51.1  2016 2:2016 2
:19pm   

 

 Monocytes (%) (Auto)  4.6  %     1.7-9.3  2016 2:2016 2:19pm
   

 

 Eosinophils (%) (Auto)  1.1  %     0-3  2016 2:2016 2:19pm   

 

 Basophils (%) (Auto)  0.8  %     0.0-1.0  2016 2:2016 2:19pm
   

 

 Neutrophils # (Auto)  2.6  K/uL     2.0-6.9  2016 2:2016 2:
19pm   

 

 Lymphocytes # (Auto)  0.6  K/uL   L  1.2-3.4  2016 2:2016 2:
19pm   

 

 Monocytes # (Auto)  0.2  K/uL     0.1-0.6  2016 2:2016 2:
19pm   

 

 Eosinophils # (Auto)  0.0  K/uL     0.0-0.7  2016 2:2016 2:
19pm   

 

 Basophils # (Auto)  0.0  K/uL     0.0-0.2  2016 2:2016 2:
19pm   

 

 Neutrophils  70.0  %   H  50-65  2016 2:2016 2:49pm   

 

 Band Neutrophils  4.0  %     0-5  2016 2:10pm  2016 2:49pm   

 

 Lymphocytes (Manual)  22.0  %   L  25-40  2016 2:2016 2:49pm
   

 

 Monocytes (Manual)  4.0  %     4-10  2016 2:2016 2:49pm   

 

 Prothrombin Time  10.6  SECONDS     9.10-11.20  2016 2:10pm  2016 2
:49pm   

 

 Prothromb Time International Ratio  1.05        0.9-1.1  2016 2:10pm  04/
 2:49pm  THERAPEUTIC RANGE 1.0 - 3.0  

****PLEASE NOTE REFERENCE RANGE****    

 

 Random Glucose  138  mg/dL   H    2016 2:2016 3:02pm   

 

 Blood Urea Nitrogen  11  mg/dL     7-18  2016 2:2016 3:02pm 
  

 

 Creatinine  0.8  mg/dL     0.55-1.02  2016 2:2016 3:02pm   

 

 Sodium Level  142  mEq/L     136-145  2016 2:2016 3:02pm   

 

 Potassium Level  3.9  mEq/L     3.5-5.0  2016 2:2016 3:02pm 
  

 

 Chloride Level  106  mEq/L       2016 2:2016 3:02pm   

 

 Carbon Dioxide Level  23.4  mEq/L     21-32  2016 2:2016 3:
02pm   

 

 Calcium Level  8.5  mg/dL   L  8.8-10.5  2016 2:2016 3:02pm 
  

 

 Magnesium Level  2.0  mg/dL     1.8-2.4  2016 2:2016 3:02pm 
  

 

 Total Protein  7.1  gm/dL     6.4-8.2  2016 2:2016 3:02pm   

 

 Albumin  3.4  gm/dL     3.4-5.0  2016 2:2016 3:02pm   

 

 Total Bilirubin  0.43  mg/dL     0.00-1.00  2016 2:2016 3:
02pm   

 

 Aspartate Amino Transf (AST/SGOT)  20  U/L     15-37  2016 2:2016 3:02pm   

 

 Alanine Aminotransferase (ALT/SGPT)  30  U/L     12-78  2016 2:10pm   3:02pm   

 

 Total Alkaline Phosphatase  88  U/L       2016 2:10pm  2016 3
:02pm   

 

 Total Creatine Kinase  50  U/L       2016 2:10pm  2016 3:
02pm   

 

 B-Type Natriuretic Peptide  16  pg/mL       2016 2:10pm  2016 3:02pm  <50 years of age  

NT-proBNP values <300 pg/mL have a 99% negative predictive 

value for excluding acute congestive heart failure (CHF).  A 

cutoff of 1,200 pg/mL for patients with an eGFR <60 yields a 

diagnostic sensitivity and specificity of 89% and 72% for 

acute CHF.  NT-proBNP values >450 pg/mL are consistent with 

CHF in adults under 50 years of age.  

 

 Creatine Kinase MB  < 0.5  NG/ML     0-3.6  2016 2:10pm  2016 3:
02pm   

 

 Myoglobin  19.0  NG/ML     10.5-92.5  2016 2:10pm  2016 3:02pm   

 

 Troponin I  0.0  NG/ML     0.0-0.2  2016 2:10pm  2016 3:02pm   

 

 Urine Color  RED      H  YELLOW  2016 2:30pm  2016 2:56pm   

 

 Urine Appearance  CLOUDY      H  CLEAR  2016 2:30pm  2016 2:56pm   

 

 Urine Glucose (UA)  NEGATIVE  mg/dL     NEGATIVE  2016 2:30pm  2016 2:56pm   

 

 Urine Bilirubin  NEGATIVE        NEGATIVE  2016 2:30pm  2016 2:
56pm   

 

 Urine Ketones  NEGATIVE  mg/dL     NEGATIVE  2016 2:30pm  2016 2:
56pm   

 

 Urine Specific Gravity  1.025        1.010-1.025  2016 2:30pm  2016 2:56pm   

 

 Urine Occult Blood  3+ (Large)      H  NEGATIVE  2016 2:30pm  2016 
2:56pm  URINE CULTURE ORDERED PER MEDICAL STAFF-APPROVED PROTOCOL 

FOR LAB. 

 

 Urine pH  6.5        5.0-8.0  2016 2:30pm  2016 2:56pm   

 

 Urine Protein  1+ (30 mg/dl)  mg/dL   H  NEGATIVE  2016 2:30pm  2016 2:56pm   

 

 Urine Urobilinogen  2.0 mg/dL  E.U./dL   H  0.2-1.0  2016 2:30pm  2016 2:56pm   

 

 Urine Nitrate  POSITIVE      H  NEGATIVE  2016 2:30pm  2016 2:56pm
   

 

 Urine Leukocyte Esterase  NEGATIVE        NEGATIVE  2016 2:30pm  2016 2:56pm   

 

 Urine RBC  TOO NUMEROUS TO CNT.  /hpf   H  0  2016 2:30pm  2016 2:
58pm   

 

 Urine WBC  1-2  /hpf     0-4  2016 2:30pm  2016 2:58pm   

 

 Urine Squamous Epithelial Cells  5-8  /hpf     0-1  2016 2:30pm  2016 2:58pm   

 

 Urine Bacteria  2+      H  NEGATIVE  2016 2:30pm  2016 2:58pm   

 

 Urine HCG, Qualitative  NEGATIVE        NEG  2016 2:30pm  2016 2:
58pm   

 

 Glomerular Filtration Rate Calc  82.1  mL/min        2016 2:10pm  2016 3:02pm   







Procedures







 Procedure  Status  Date  Provider(s)

 

 Portable x-ray of chest  Completed  16  AYAD JAFFE DO







Encounters







 Encounter  Location  Arrival/Admit Date  Discharge/Depart Date  Attending 
Provider

 

 Departed Emergency Room  [place]  16 1:31pm  16 3:30pm  AYAD JAFFE DO











 Recent Diagnosis

## 2018-10-13 NOTE — XMS REPORT
[facility]

 Created on: 2016



Marie Cohen

External Reference #: [id number]

: 1970

Sex: Female



Demographics







 Address  [address] [place]  [address]

 

 Home Phone  [phone]

 

 Preferred Language  Unknown

 

 Marital Status  Unknown

 

 Tenriism Affiliation  Unknown

 

 Race  

 

 Ethnic Group   or 





Author







 Author  BRITNEY CONWAY

 

 Organization  [employer]

 

 Address  Unknown

 

 Phone  Unavailable







Care Team Providers







 Care Team Member Name  Role  Phone

 

 BRITNEY CONWAY  CP  Unavailable



                                                                



Allergies

          No Known Allergies                                                   
                                     



Problems

          No Known Problems                                                    
                                    



Medications

          No Known Medications                                                 
                             



Results

          No Known Results                                                     
               



Summary Purpose

          eClinicalWorks Submission

## 2018-10-13 NOTE — XMS REPORT
[facility]

 Created on: 2018



Marie Cohen

External Reference #: [id number]

: 1970

Sex: Female



Demographics







 Address  [address] [place]  [address]

 

 Preferred Language  Unknown

 

 Marital Status  Unknown

 

 Scientologist Affiliation  Unknown

 

 Race  Unknown

 

 Ethnic Group  Unknown





Author







 Author  MARCUS SMITH

 

 [facility]

 

 Address  [address] [place] [address]  [address]



 

 Phone  [phone]







Care Team Providers







 Care Team Member Name  Role  Phone

 

 MARCUS SMITH  Unavailable  [phone]







PROBLEMS







 Type  Condition  ICD9-CM Code  PUQ13-JZ Code  Onset Dates  Condition Status  
SNOMED Code

 

 Problem  Anxiety     F41.9     Active  [id number]







ALLERGIES







 Substance  Reaction  Event Type  Date  Status

 

 Ibuprofen  angioedema  Drug Allergy    Active







ENCOUNTERS







 Encounter  Location  Date  Diagnosis

 

 [facility]  [address] [place], [place] [id number] [place] [address] 
[address]    Multiple complaints R68.89

 

 [facility]  [address] [place], [place] [id number] [place] [address] 
[address]  31 Oct, 2017  Urinary frequency R35.0 ; Multiple complaints R68.89 
and Anxiety F41.9

 

 [facility]  [address] [place], [place] [id number] [place] [address] 
[address]    Lump of left breast N63 ; Well woman exam Z01.419 and 
Uterine leiomyoma, unspecified location D25.9

 

 [facility]  [address] [place] [phone]  29 Mar, 2017  Bronchitis J40

 

 [facility]  [address] [place], [place] [id number] [place] [id number]  10 2017   

 

 [facility]  [id number] [place] [phone]  08 2017  Sore throat J02.9 and Acute upper respiratory infection 
J06.9

 

 [facility]  [id number] [place] [id number]  
  Encounter for immunization Z23

 

 [facility]  [id number] [place] [id number]  
26 Oct, 2016  Dizziness R42

 

 [place] [address] [id number] [id number]  
24 Oct, 2016  Dizziness R42 and Anemia due to other cause, not classified D64.89

 

 [place] [address] [id number][place] [id number]  
02 May, 2016  Anemia due to other cause, not classified D64.89 and Abnormal 
urine R82.90

 

 [address] [id number] Blanc[place] [id number]     

 

 [place] [address] [id number][place] [id number]  
  Abnormal urine R82.90 ; Acute cystitis with hematuria N30.01 and 
Iron deficiency E61.1

 

 [place] [address] [id number][place] [id number]  
   

 

 [place] [address] [facility] [id number][place] [id number]  
  Anemia due to other cause, not classified D64.89

 

 [place] [address] [facility] [id number], [place] [id number]  
   

 

 [place] [address] [id number][place] [id number]  
  Anemia due to other cause, not classified D64.89

 

 [place] [address] [id number], [place] [id number]  
   

 

 [place] [address] [id number], [place] [id number]  
  Anemia due to other cause, not classified D64.89

 

 [place] [address] [id number][place] [id number]  
  Gastroesophageal reflux disease, esophagitis presence not 
specified K21.9 and Anemia due to other cause, not classified D64.89

 

 [place] [address] [id number][place] [id number]  
31 Mar, 2016  Prolonged menstrual cycle N92.1 ; Dizziness R42 ; GERD (
gastroesophageal reflux disease) K21.9 ; Chest pain R07.9 ; Fatigue R53.83 and 
Chills (without fever) R68.83

 

 [place] [address] [id number][place] [id number]  
30 Mar, 2016  Prolonged menstrual cycle N92.1 ; Dizziness R42 ; GERD (
gastroesophageal reflux disease) K21.9 ; Chest pain R07.9 ; Fatigue R53.83 and 
Chills (without fever) R68.83

 

 [place] [address] [id number][place] [id number]  
27 Oct, 2015  Encounter for immunization Z23

 

 [address] [id number][place] [id number]  
26 Oct, 2015  Allergic rhinitis, unspecified allergic rhinitis type J30.9 and 
Post-nasal drip R09.82

 

 [address] [id number][place] [id number]  27 Aug, 2015  Chest pain 786.50 and GERD (gastroesophageal 
reflux disease) 530.81

 

 [place] [id number] [place] [id number]  10 Mohan, 2015  Well woman exam V70.0 ; Pap smear for cervical 
cancer screening V76.2 and Breast cancer screening V76.10

 

 [id number] [id number]    Encounter to establish care V65.8







IMMUNIZATIONS

No Known Immunizations



SOCIAL HISTORY

Never Assessed



REASON FOR VISIT

Annual physical (female) Eliu,RN



PLAN OF CARE







 Activity  Details









  









 Follow Up  prn Reason:







VITAL SIGNS







 Height  5 ft 3 in in  2017

 

 Weight  153.0 lbs  2017

 

 Temperature  98.5 degrees Fahrenheit  2017

 

 Heart Rate  83 bpm  2017

 

 Respiratory Rate  18   2017

 

 BMI  27.10 kg/m2  2017

 

 Blood pressure systolic  118 mmHg  2017

 

 Blood pressure diastolic  70 mmHg  2017







MEDICATIONS







 Medication  Instructions  Dosage  Frequency  Start Date  End Date  Duration  
Status

 

 Tylenol 325 MG  Orally every 6 hrs  2 tablets as needed  6h           Active







RESULTS







 Name  Result  Date  Reference Range

 

 Mammogram Dx, Bilateral     2017   







PROCEDURES







 Procedure  Date Ordered  Result  Body Site

 

 PELVIC EXAM UNDER ANESTHESIA  2017      







INSTRUCTIONS





MEDICATIONS ADMINISTERED

No Known Medications



MEDICAL (GENERAL) HISTORY







 Type  Description  Date

 

 Medical History  acid reflux   

 

 Medical History  seasonal allergies   

 

 Medical History  anemia   

 

 Hospitalization History  Abnormal vaginal bleeding

## 2018-10-13 NOTE — XMS REPORT
[facility]

 Created on: 2018



Marie Cohen

External Reference #: [id number]

: 1970

Sex: Female



Demographics







 Address  [address] [place]  [address]

 

 Preferred Language  Unknown

 

 Marital Status  Unknown

 

 Buddhist Affiliation  Unknown

 

 Race  Unknown

 

 Ethnic Group  Unknown





Author







 Author  DIMPLE UNDERWOOD

 

 [facility]

 

 Address  [address] [place]  [address]



 

 Phone  [phone]







Care Team Providers







 Care Team Member Name  Role  Phone

 

 DIMPLE UNDERWOOD  Unavailable  [phone]







PROBLEMS







 Type  Condition  ICD9-CM Code  OOJ57-VP Code  Onset Dates  Condition Status  
SNOMED Code

 

 Problem  Anxiety     F41.9     Active  [id number]







ALLERGIES







 Substance  Reaction  Event Type  Date  Status

 

 Ibuprofen  angioedema  Drug Allergy    Active







ENCOUNTERS







 Encounter  Location  Date  Diagnosis

 

 [facility]  [address] [id number] [place] [address]    Left breast lump N63.20

 

 [facility]  [address] [place], [place] [id number] [place] [address] 
[address]  17 May, 2018  Dysuria R30.0 and Recurrent UTI (urinary tract 
infection) N39.0

 

 [facility]  [address] [place], [place] [id number] [place] [address] 
[address]    Multiple complaints R68.89

 

 [facility]  [address] [place] [id number], [place] 
[address]  31 Oct, 2017  Urinary frequency R35.0 ; Multiple complaints R68.89 
and Anxiety F41.9

 

 [facility]  [address] [place], [place] [id number] [place] [address] 
[address]    Lump of left breast N63 ; Well woman exam Z01.419 and 
Uterine leiomyoma, unspecified location D25.9

 

 [facility]  [address] [address]  29 Mar, 2017  Bronchitis J40

 

 [facility]  [address] [place], [place] [id number] [id number]  10 2017   

 

 [facility]  [address] [id number] [address]  08 2017  Sore throat J02.9 and Acute upper respiratory infection 
J06.9

 

 [address] [id number] [id number]  
  Encounter for immunization Z23

 

 [address] [id number], [place] [id number]  
26 Oct, 2016  Dizziness R42

 

 [address] [id number] [id number]  
24 Oct, 2016  Dizziness R42 and Anemia due to other cause, not classified D64.89

 

 [address] [id number] [address]  
02 May, 2016  Anemia due to other cause, not classified D64.89 and Abnormal 
urine R82.90

 

 [address] [id number] [id number]     

 

 [address] [id number] [address]  
  Abnormal urine R82.90 ; Acute cystitis with hematuria N30.01 and 
Iron deficiency E61.1

 

 [address] [id number][place] [id number]  
   

 

 [id number] [id number]  
  Anemia due to other cause, not classified D64.89

 

 [address]65[address] [id number]  
   

 

 [address] [id number], [place] [address]  
  Anemia due to other cause, not classified D64.89

 

 [address] [id number][place] [address]  
   

 

 [address] [id number], [place] [address]  
  Anemia due to other cause, not classified D64.89

 

 [facility] [place]  [address] [facility] [id number] [place] [id number]  
  Gastroesophageal reflux disease, esophagitis presence not 
specified K21.9 and Anemia due to other cause, not classified D64.89

 

 [facility]  [address] [id number], [place] [address]  
31 Mar, 2016  Prolonged menstrual cycle N92.1 ; Dizziness R42 ; GERD (
gastroesophageal reflux disease) K21.9 ; Chest pain R07.9 ; Fatigue R53.83 and 
Chills (without fever) R68.83

 

 [address] [id number], [place] [address]  
30 Mar, 2016  Prolonged menstrual cycle N92.1 ; Dizziness R42 ; GERD (
gastroesophageal reflux disease) K21.9 ; Chest pain R07.9 ; Fatigue R53.83 and 
Chills (without fever) R68.83

 

 [address] [id number][place] [address]  
27 Oct, 2015  Encounter for immunization Z23

 

 [address] [id number], [place] [address]  
26 Oct, 2015  Allergic rhinitis, unspecified allergic rhinitis type J30.9 and 
Post-nasal drip R09.82

 

 [address] [id number] [address]  27 Aug, 2015  Chest pain 786.50 and GERD (gastroesophageal 
reflux disease) 530.81

 

 [address] [id number] Mills[place] [id number]  10 Mohan, 2015  Well woman exam V70.0 ; Pap smear for cervical 
cancer screening V76.2 and Breast cancer screening V76.10

 

 [address] [id number] [place] [address]    Encounter to establish care V65.8







IMMUNIZATIONS

No Known Immunizations



SOCIAL HISTORY

Never Assessed



REASON FOR VISIT

breast check, sore-Patient states that she has a lump/bump under left breast, 
states she's had mammo and it was normal. States she is also having some nipple 
pain, has been there x1 month. Aisha RN



PLAN OF CARE







 Activity  Details









  









 Follow Up  prn Reason:







VITAL SIGNS







 Height  5 ft 3 in in  2018

 

 Weight  154.1 lbs  2018

 

 Temperature  97.6 degrees Fahrenheit  2018

 

 Heart Rate  84 bpm  2018

 

 Respiratory Rate  18   2018

 

 Oximetry  99 %  2018

 

 BMI  27.29 kg/m2  2018

 

 Blood pressure systolic  116 mmHg  2018

 

 Blood pressure diastolic  72 mmHg  2018







MEDICATIONS

No Known Medications



RESULTS







 Name  Result  Date  Reference Range

 

 Mammogram Dx, Left     2018-07-15   







PROCEDURES

No Known procedures



INSTRUCTIONS





MEDICATIONS ADMINISTERED

No Known Medications



MEDICAL (GENERAL) HISTORY







 Type  Description  Date

 

 Medical History  acid reflux   

 

 Medical History  seasonal allergies   

 

 Medical History  anemia   

 

 Hospitalization History  Abnormal vaginal bleeding

## 2018-10-13 NOTE — XMS REPORT
[facility]

 Created on: 10/27/2015



Marie Cohen

External Reference #: [id number]

: 1970

Sex: Female



Demographics







 Address  [address] [place] [address]  [address]

 

 Home Phone  [phone]

 

 Preferred Language  Unknown

 

 Marital Status  Unknown

 

 Tenriism Affiliation  Unknown

 

 Race  

 

 Ethnic Group   or 





Author







 Author  BRENT LARSEN

 

 [facility]  [employer]

 

 Address  Unknown

 

 Phone  Unavailable







Care Team Providers







 Care Team Member Name  Role  Phone

 

 BRENT LARSEN  CP  Unavailable



                                                                



Allergies

          No Known Allergies                                                   
                                     



Problems

          





 Problem Type  Condition  Code  Onset Dates  Condition Status

 

 Assessment  Encounter for immunization  Z23     Active



                                                                               
         



Medications

          No Known Medications                                                 
                                       



Procedures

          





 Procedure  Coding System  Code  Date

 

 SINGLE IMMUNIZATION ADMIN  CPT-4  88155  Oct 27, 2015

 

 FLUARIX QUAD (3 & UP)-GSK-  CPT-4  33044  Oct 27, 2015



                                                                               
         



Results

          No Known Results                                                     
                                   



Immunizations

          





 Vaccine  Administration Date

 

 FLUARIX QUAD (3 & UP)-GSK-2015  Oct 27, 2015



                                                                    



Summary Purpose

          [employer] Submission

## 2018-10-13 NOTE — XMS REPORT
[facility]

 Created on: 2017



Marie Cohen

External Reference #: [id number]

: 1970

Sex: Female



Demographics







 Address  [address] [place]  [address]

 

 Preferred Language  Unknown

 

 Marital Status  Unknown

 

 Buddhism Affiliation  Unknown

 

 Race  Unknown

 

 Ethnic Group  Unknown





Author







 Author  JANEE Steen

 

 Organization  [facility]

 

 Address  Unknown

 

 Phone  Unavailable







Care Team Providers







 Care Team Member Name  Role  Phone

 

 JANEE Steen  Unavailable  Unavailable







PROBLEMS

No Known Problems



ALLERGIES







 Substance  Reaction  Event Type  Date  Status

 

 Ibuprofen  angioedema  Drug Allergy    Active







SOCIAL HISTORY

Never Assessed



PLAN OF CARE







 Activity  Details









  









 Follow Up  prn Reason:







VITAL SIGNS







 Height  5 ft 3 in in  2017

 

 Weight  150.2 lbs  2017

 

 Temperature  98.9 degrees Fahrenheit  2017

 

 Heart Rate  112 bpm  2017

 

 Respiratory Rate  20   2017

 

 BMI  26.60 kg/m2  2017

 

 Blood pressure systolic  110 mmHg  2017

 

 Blood pressure diastolic  86 mmHg  2017







MEDICATIONS







 Medication  Instructions  Dosage  Frequency  Start Date  End Date  Duration  
Status

 

 PredniSONE 50 mg  Orally Once a day  1 tablet  24h  
  05 days  Active

 

 Tylenol 325 MG  Orally every 6 hrs  2 tablets as needed  6h           Active

 

 Azithromycin 250 MG  Orally Once a day  2 tablets  on the first day, then 1 
tablet daily for 4 days  24h    5 day(s)  Active







RESULTS







 Name  Result  Date  Reference Range

 

 INFLUENZA A & B (IN HOUSE)     2017   

 

 INFLUENZA A  neg      

 

 INFLUENZA B  neg      

 

 Control  +      

 

 Lot #  [id number]      

 

 Exp date  2017      







PROCEDURES







 Procedure  Date Ordered  Result  Body Site

 

 INFLUENZA ASSAY W/OPTIC  2017      







IMMUNIZATIONS

No Known Immunizations



MEDICAL (GENERAL) HISTORY







 Type  Description  Date

 

 Medical History  acid reflux   

 

 Medical History  seasonal allergies   

 

 Medical History  anemia

## 2023-05-08 NOTE — XMS REPORT
[facility]

 Created on: 2016



Marie Cohen

External Reference #: [id number]

: 1970

Sex: Female



Demographics







 Address  [address] [place]  [address]

 

 Home Phone  [phone]

 

 Preferred Language  Unknown

 

 Marital Status  Unknown

 

 Caodaism Affiliation  Unknown

 

 Race  

 

 Ethnic Group   or 





Author







 Author  BRITNEY CONWAY

 

 Organization  [employer]

 

 Address  Unknown

 

 Phone  Unavailable







Care Team Providers







 Care Team Member Name  Role  Phone

 

 BRITNEY CONWAY  CP  Unavailable



                                                                



Allergies

          No Known Allergies                                                   
                                     



Problems

          No Known Problems                                                    
                                    



Medications

          No Known Medications                                                 
                             



Results

          No Known Results                                                     
               



Summary Purpose

          e[employer] Submission Terbinafine Counseling: Patient counseling regarding adverse effects of terbinafine including but not limited to headache, diarrhea, rash, upset stomach, liver function test abnormalities, itching, taste/smell disturbance, nausea, abdominal pain, and flatulence.  There is a rare possibility of liver failure that can occur when taking terbinafine.  The patient understands that a baseline LFT and kidney function test may be required. The patient verbalized understanding of the proper use and possible adverse effects of terbinafine.  All of the patient's questions and concerns were addressed.